# Patient Record
(demographics unavailable — no encounter records)

---

## 2024-12-17 NOTE — PLAN
[TextEntry] : Discussed head circumference, developing well and no behavioral/developmental concerns, will monitor and re-evaluate at 12-month unless changes occur, counseled likely head size catching up from hx of FTT   Continue breast milk or formula as desired. Increase table foods and with family mealtimes, 3 meals with 2-3 snacks per day. Discussed weaning of bottle and pacifier.   When in car, patient should be in rear-facing car seat in back seat, do not leave baby in car for any reason or any time. Put baby to sleep on back, in own crib with no loose or soft bedding, no co-sleeping. Can use sunscreen if outside, do not put baby in direct sunlight. Help baby to develop consistent sleep and feeding routines. May offer pacifier if needed. Avoid screen/tv time at this age. Wash hands often, especially after changing diaper. Keep tobacco/vape free household and environment. Encourage family/caregivers to get protective vaccinations. Use sunscreen when outside, avoid direct sunlight. Recognize stranger anxiety. Ensure home is safe due to baby being increasingly mobile. Do not leave baby alone. Use consistent, positive discipline. Read and sing aloud to baby.   No propping bottles, and no bottles in bed, wash/clean teeth with wash cloth or a soft toothbrush Do not give baby honey. Avoid sweetened drinks/juices. Avoid choking by limiting finger foods to smaller than a cheerio. No hard/compressible foods. Startdaily multivitamin with fluoride.   [ Vaccinations given at this visit can cause low grade temperature, irritability, and site reaction -- can use warm compress and Tylenol / Ibuprofen as needed ]   Return for 12 month well visit or sooner if needed or if concerns arise.

## 2024-12-17 NOTE — PHYSICAL EXAM
[Alert] : alert [Normocephalic] : normocephalic [Flat Open Anterior [place]] : flat open anterior fontanelle [Red Reflex] : red reflex bilateral [PERRL] : PERRL [Normally Placed Ears] : normally placed ears [Auricles Well Formed] : auricles well formed [Clear Tympanic membranes] : clear tympanic membranes [Light reflex present] : light reflex present [Bony landmarks visible] : bony landmarks visible [Nares Patent] : nares patent [Palate Intact] : palate intact [Uvula Midline] : uvula midline [Supple, full passive range of motion] : supple, full passive range of motion [Symmetric Chest Rise] : symmetric chest rise [Clear to Auscultation Bilaterally] : clear to auscultation bilaterally [Regular Rate and Rhythm] : regular rate and rhythm [S1, S2 present] : S1, S2 present [+2 Femoral Pulses] : (+) 2 femoral pulses [Soft] : soft [Bowel Sounds] : bowel sounds present [Central Urethral Opening] : central urethral opening [Testicles Descended] : testicles descended bilaterally [No Abnormal Lymph Nodes Palpated] : no abnormal lymph nodes palpated [Symmetric Abduction and Rotation of hips] : symmetric abduction and rotation of hips [Straight] : straight [Cranial Nerves Grossly Intact] : cranial nerves grossly intact [Acute Distress] : no acute distress [Consolable] : consolable [Playful] : playful [Excessive Tearing] : no excessive tearing [Discharge] : no discharge [Palpable Masses] : no palpable masses [Murmurs] : no murmurs [Tender] : nontender [Distended] : nondistended [Hepatomegaly] : no hepatomegaly [Splenomegaly] : no splenomegaly [Allis Sign] : negative Allis sign [Rash or Lesions] : no rash/lesions [de-identified] : no focal deficits

## 2024-12-17 NOTE — DISCUSSION/SUMMARY
[Normal Growth] : growth [Normal Development] : development [None] : No known medical problems [No Elimination Concerns] : elimination [No Feeding Concerns] : feeding [No Skin Concerns] : skin [Normal Sleep Pattern] : sleep [Family Adaptation] : family adaptation [Infant [place]] : infant independence [Feeding Routine] : feeding routine [Safety] : safety [No Medications] : ~He/She~ is not on any medications [Parent/Guardian] : parent/guardian [] : The components of the vaccine(s) to be administered today are listed in the plan of care. The disease(s) for which the vaccine(s) are intended to prevent and the risks have been discussed with the caretaker.  The risks are also included in the appropriate vaccination information statements which have been provided to the patient's caregiver.  The caregiver has given consent to vaccinate. [FreeTextEntry1] : Defer flu and covid at this time

## 2024-12-17 NOTE — DEVELOPMENTAL MILESTONES
[Normal Development] : Normal Development [None] : none [Uses basic gestures] : uses basic gestures [Says "Mani" or "Mama"] : says "Mani" or "Mama" nonspecifically [Sits well without support] : sits well without support [Transitions between sitting and lying] : transitions between sitting and lying [Balances on hands and knees] : balances on hands and knees [Picks up small objects with 3 fingers] : picks up small objects with 3 fingers and thumb [Releases objects intentionally] : releases objects intentionally [Independence objects together] : bangs objects together [Yes] : Completed. [Crawls] : does not crawl

## 2024-12-17 NOTE — HISTORY OF PRESENT ILLNESS
[Mother] : mother [Well-balanced] : well-balanced [Fruit] : fruit [Vegetables] : vegetables [Cereal] : cereal [Meat] : meat [Eggs] : eggs [Peanut] : peanut [Dairy] : dairy [Water] : water [Normal] : Normal [In Crib] : sleeps in crib [On back] : sleeps on back [Wakes up at night] : wakes up at night [Sleeps 12-16 hours per 24 hours (including naps)] : sleeps 12-16 hours per 24 hours (including naps) [No] : No cigarette smoke exposure [Water heater temperature set at <120 degrees F] : Water heater temperature set at <120 degrees F [Rear facing car seat in  back seat] : Rear facing car seat in  back seat [Carbon Monoxide Detectors] : Carbon monoxide detectors [Smoke Detectors] : Smoke detectors [Delayed] : delayed [Loose bedding, pillow, toys, and/or bumpers in crib] : no loose bedding, pillow, toys, and/or bumpers in crib [None] : Primary Fluoride Source: None [Screen time only for video chatting] : screen time only for video chatting [FreeTextEntry7] : 9m well visit [de-identified] : mixing EHM and formula  [FreeTextEntry1] : No GI Eating very well, no concerns  No other specialists

## 2025-03-18 NOTE — HISTORY OF PRESENT ILLNESS
[Mother] : mother [Father] : father [Cow's milk ___ oz/feed] : [unfilled] oz of Cow's milk per feed [Dairy] : dairy [Normal] : Normal [Sippy cup use] : Sippy cup use [Brushing teeth] : Brushing teeth [Vitamin] : Primary Fluoride Source: Vitamin [Playtime] : Playtime  [No] : Not at  exposure [Water heater temperature set at <120 degrees F] : Water heater temperature set at <120 degrees F [Car seat in back seat] : Car seat in back seat [Smoke Detectors] : Smoke detectors [Exposure to electronic nicotine delivery system] : No exposure to electronic nicotine delivery system [Carbon Monoxide Detectors] : Carbon monoxide detectors [At risk for exposure to TB] : Not at risk for exposure to Tuberculosis [Up to date] : Up to date [FreeTextEntry7] : 12m well visit [de-identified] : eats all variation of foods, eggs peanuts, mixing whole milk and formula, 25 oz per day  [de-identified] : open cup  [FreeTextEntry1] : Not walking or crawling yet but making steps with assistance  no other concerns

## 2025-03-18 NOTE — PLAN
[TextEntry] : Get BW as discussed  Continue breast feeding if applicable, or transition to whole milk, limit to 16-18 oz. a day to prevent iron deficiency anemia. Continue table foods to 3x/day. Increase to 2-3 servings of proteins a day and offer wide variety of textures and healthy food groups. Encourage sitting and eating meals at table with family. Incorporate water with sippy cup and avoid sweetened juices/drinks.   When in car, patient should be in rear-facing car seat in back seat up to 2 years old, do not leave baby in car for any reason or any time. Put baby to sleep on back, in own crib with no loose or soft bedding, no co-sleeping. Help baby to develop consistent sleep and feeding routines, daily routines such as brushing and cleaning teeth twice daily after breakfast and before bed. Continue to allow for 1 nap/day. Avoid screen/tv time at this age. Wash hands often, especially after changing diaper. Keep tobacco/vape free household and environment. Encourage family/caregivers to get protective vaccinations. Use sunscreen when outside, avoid direct sunlight. Recognize stranger and separation anxiety. Ensure home is safe due to baby being increasingly mobile. Do not leave baby alone. Use consistent, positive discipline. Read and sing aloud to baby.   No propping bottles, and no bottles in bed, wash/clean teeth with wash cloth or a soft toothbrush Avoid choking by limiting finger foods to smaller than a cheerio. No hard/compressible foods. Continue daily multivitamin with fluoride.   [ Vaccinations given at this visit can cause low grade temperature, irritability, and site reaction -- can use warm compress and Tylenol / Ibuprofen as needed ]   Return for 15 month well visit or sooner if needed or if concerns arise.

## 2025-03-18 NOTE — PHYSICAL EXAM
[Alert] : alert [Normocephalic] : normocephalic [Closed Anterior [place]] : closed anterior fontanelle [Red Reflex] : red reflex bilateral [PERRL] : PERRL [Normally Placed Ears] : normally placed ears [Auricles Well Formed] : auricles well formed [Clear Tympanic membranes] : clear tympanic membranes [Light reflex present] : light reflex present [Bony landmarks visible] : bony landmarks visible [Discharge] : no discharge [Nares Patent] : nares patent [Palate Intact] : palate intact [Uvula Midline] : uvula midline [Tooth Eruption] : tooth eruption [Supple, full passive range of motion] : supple, full passive range of motion [Palpable Masses] : no palpable masses [Symmetric Chest Rise] : symmetric chest rise [Clear to Auscultation Bilaterally] : clear to auscultation bilaterally [Regular Rate and Rhythm] : regular rate and rhythm [S1, S2 present] : S1, S2 present [Murmurs] : no murmurs [+2 Femoral Pulses] : (+) 2 femoral pulses [Soft] : soft [Tender] : nontender [Distended] : nondistended [Bowel Sounds] : normoactive bowel sounds [Hepatomegaly] : no hepatomegaly [Splenomegaly] : no splenomegaly [Central Urethral Opening] : central urethral opening [Testicles Descended] : testicles descended bilaterally [No Abnormal Lymph Nodes Palpated] : no abnormal lymph nodes palpated [Symmetric Abduction and Rotation of Hips] : symmetric abduction and rotation of hips [Allis Sign] : negative Allis sign [Straight] : straight [Cranial Nerves Grossly Intact] : cranial nerves grossly intact [Rash or Lesions] : no rash/lesions

## 2025-03-18 NOTE — DEVELOPMENTAL MILESTONES
[Normal Development] : Normal Development [None] : none [Looks for hidden objects] : looks for hidden objects [Imitates new gestures] : imitates new gestures [Says "Dad" or "Mom" with meaning] : says "Dad" or "Mom" with meaning [Uses one word other than Mom or] : uses one word other than Mom or Dad or personal names [Follows a verbal command that] : follows a verbal command that includes a gesture [Takes first independent] : does not take first independent steps [Stands without support] : does not stand without support [Drops object in a cup] : drops object in a cup [Picks up small object with 2 finger] : picks up small object with 2 finger pincer grasp [Picks up food and eats it] : picks up food and eats it

## 2025-06-24 NOTE — PHYSICAL EXAM
[Alert] : alert [No Acute Distress] : no acute distress [Normocephalic] : normocephalic [Anterior [place] Closed] : anterior fontanelle closed [Red Reflex Bilateral] : red reflex bilateral [PERRL] : PERRL [Normally Placed Ears] : normally placed ears [Auricles Well Formed] : auricles well formed [Clear Tympanic membranes with present light reflex and bony landmarks] : clear tympanic membranes with present light reflex and bony landmarks [No Discharge] : no discharge [Nares Patent] : nares patent [Palate Intact] : palate intact [Uvula Midline] : uvula midline [Tooth Eruption] : tooth eruption  [Supple, full passive range of motion] : supple, full passive range of motion [No Palpable Masses] : no palpable masses [Symmetric Chest Rise] : symmetric chest rise [Clear to Auscultation Bilaterally] : clear to auscultation bilaterally [Regular Rate and Rhythm] : regular rate and rhythm [S1, S2 present] : S1, S2 present [No Murmurs] : no murmurs [+2 Femoral Pulses] : +2 femoral pulses [Soft] : soft [NonTender] : non tender [Non Distended] : non distended [Normoactive Bowel Sounds] : normoactive bowel sounds [No Hepatomegaly] : no hepatomegaly [No Splenomegaly] : no splenomegaly [Central Urethral Opening] : central urethral opening [Testicles Descended Bilaterally] : testicles descended bilaterally [Patent] : patent [Normally Placed] : normally placed [No Abnormal Lymph Nodes Palpated] : no abnormal lymph nodes palpated [No Clavicular Crepitus] : no clavicular crepitus [Negative Beatty-Ortalani] : negative Beatty-Ortalani [Symmetric Buttocks Creases] : symmetric buttocks creases [No Spinal Dimple] : no spinal dimple [NoTuft of Hair] : no tuft of hair [Cranial Nerves Grossly Intact] : cranial nerves grossly intact [No Rash or Lesions] : no rash or lesions

## 2025-06-24 NOTE — PLAN
[TextEntry] : repeat hemoglobin normal range  Continue whole cow's milk. Continue table foods, 3 meals with 2-3 snacks per day. Continue MV with fluoride daily. rush teeth twice a day with soft toothbrush. Recommend visit to dentist. When in car, keep child in rear-facing car seats until age 2, or until the maximum height and weight for seat is reached. Put baby to sleep in own crib. Lower crib mattress. Help baby to maintain consistent daily routines and sleep schedule. Recognize stranger and separation anxiety. Ensure home is safe since baby is increasingly mobile. Be within arm's reach of baby at all times. Use consistent, positive discipline. Read aloud to baby.  return for 18 month visit or sooner if concerns arise

## 2025-06-24 NOTE — HISTORY OF PRESENT ILLNESS
[Father] : father [Normal] : Normal [Brushing teeth] : Brushing teeth [Vitamin] : Primary Fluoride Source: Vitamin [No] : Not at  exposure [Water heater temperature set at <120 degrees F] : Water heater temperature set at <120 degrees F [Car seat in back seat] : Car seat in back seat [Carbon Monoxide Detectors] : Carbon monoxide detectors [Smoke Detectors] : Smoke detectors [Up to date] : Up to date [Exposure to electronic nicotine delivery system] : No exposure to electronic nicotine delivery system [FreeTextEntry7] : 15m well visit  [de-identified] : not consistent with vitamin; 8 oz whole milk BID  [FreeTextEntry1] : No specialists   did not get BW done

## 2025-06-24 NOTE — DEVELOPMENTAL MILESTONES
[Normal Development] : Normal Development [None] : none [Imitates scribbling] : imitates scribbling [Drinks from cup with little] : drinks from cup with little spilling [Points to ask for something] : points to ask for something or to get help [Speaks in sounds that seem like] : speaks in sounds that seem like an unknown language [Follows directions that do not] : follows direction that do not include a gesture [Looks when parent says,] : looks when parent says, "Where is...?" [Crawls up a few steps] : crawls up a few steps [Makes dimas with crayon] : makes dimas with evetteyon [Drops object into and takes object] : drops object into and takes object out of container [Uses 3 words other than names] : does not use 3 words other than names [Squats to  objects] : does not squat to  objects [Begins to run] : does not begin to run [FreeTextEntry1] : reviewed